# Patient Record
Sex: FEMALE | Race: WHITE | NOT HISPANIC OR LATINO
[De-identification: names, ages, dates, MRNs, and addresses within clinical notes are randomized per-mention and may not be internally consistent; named-entity substitution may affect disease eponyms.]

---

## 2021-07-06 PROBLEM — Z00.00 ENCOUNTER FOR PREVENTIVE HEALTH EXAMINATION: Status: ACTIVE | Noted: 2021-07-06

## 2021-07-08 ENCOUNTER — APPOINTMENT (OUTPATIENT)
Dept: ORTHOPEDIC SURGERY | Facility: CLINIC | Age: 61
End: 2021-07-08
Payer: COMMERCIAL

## 2021-07-08 VITALS — WEIGHT: 107 LBS | BODY MASS INDEX: 18.27 KG/M2 | HEIGHT: 64 IN

## 2021-07-08 DIAGNOSIS — Z78.9 OTHER SPECIFIED HEALTH STATUS: ICD-10-CM

## 2021-07-08 DIAGNOSIS — M25.561 PAIN IN RIGHT KNEE: ICD-10-CM

## 2021-07-08 DIAGNOSIS — Z80.9 FAMILY HISTORY OF MALIGNANT NEOPLASM, UNSPECIFIED: ICD-10-CM

## 2021-07-08 PROCEDURE — 73562 X-RAY EXAM OF KNEE 3: CPT | Mod: RT

## 2021-07-08 PROCEDURE — 99204 OFFICE O/P NEW MOD 45 MIN: CPT

## 2021-07-08 NOTE — DISCUSSION/SUMMARY
[de-identified] : Her symptoms seem to be patellofemoral. I recommended giving it time. The symptoms recurred and she will call me we would do an MRI. Followup will be as needed.

## 2021-07-08 NOTE — PHYSICAL EXAM
[Normal] : Gait: normal [de-identified] : Right knee shows no warmth or swelling and no particular tenderness. There is a negative Monica test there is no instability. She has full range of motion she has mild discomfort when she goes to squat down fully but no pain upon getting up. Neurovascular exam is normal [de-identified] : Right knee x-ray shows no evidence of fracture dislocation and preserved joint space

## 2021-07-08 NOTE — HISTORY OF PRESENT ILLNESS
[de-identified] : Location: right knee\par Quality: shooting \par Duration: 06/10/2021\par Context: Atraumatic\par Aggravating Factors: Walking, bending \par Conservative treatment:  Rest, ice, otc medication , cortisone 3 years ago \par Associated Symptoms:  Buckling, clicking , stiffness \par Prior Studies: n.a \par

## 2021-07-28 DIAGNOSIS — M23.91 UNSPECIFIED INTERNAL DERANGEMENT OF RIGHT KNEE: ICD-10-CM
